# Patient Record
Sex: FEMALE | Race: BLACK OR AFRICAN AMERICAN | NOT HISPANIC OR LATINO | Employment: OTHER | URBAN - METROPOLITAN AREA
[De-identification: names, ages, dates, MRNs, and addresses within clinical notes are randomized per-mention and may not be internally consistent; named-entity substitution may affect disease eponyms.]

---

## 2021-11-03 ENCOUNTER — OFFICE VISIT (OUTPATIENT)
Dept: OBSTETRICS AND GYNECOLOGY | Facility: CLINIC | Age: 66
End: 2021-11-03

## 2021-11-03 VITALS
DIASTOLIC BLOOD PRESSURE: 80 MMHG | SYSTOLIC BLOOD PRESSURE: 162 MMHG | HEIGHT: 65 IN | WEIGHT: 211.4 LBS | BODY MASS INDEX: 35.22 KG/M2

## 2021-11-03 DIAGNOSIS — B97.7 HPV (HUMAN PAPILLOMA VIRUS) INFECTION: Primary | ICD-10-CM

## 2021-11-03 PROCEDURE — 99202 OFFICE O/P NEW SF 15 MIN: CPT | Performed by: OBSTETRICS & GYNECOLOGY

## 2021-11-03 RX ORDER — THIAMINE HCL 100 MG
TABLET ORAL
COMMUNITY

## 2021-11-03 RX ORDER — ERGOCALCIFEROL 1.25 MG/1
CAPSULE ORAL
COMMUNITY
Start: 2021-10-07

## 2021-11-03 RX ORDER — ANASTROZOLE 1 MG/1
TABLET ORAL
COMMUNITY
Start: 2021-09-23

## 2021-11-03 RX ORDER — PETROLATUM,WHITE
OINTMENT IN PACKET (GRAM) TOPICAL
COMMUNITY
Start: 2021-10-24

## 2021-11-03 RX ORDER — ATORVASTATIN CALCIUM 20 MG/1
TABLET, FILM COATED ORAL
COMMUNITY
Start: 2021-10-15

## 2021-11-03 RX ORDER — ASPIRIN 81 MG/1
81 TABLET ORAL DAILY
COMMUNITY

## 2021-11-03 RX ORDER — ECHINACEA PURPUREA EXTRACT 125 MG
TABLET ORAL
COMMUNITY
Start: 2021-10-24

## 2021-11-03 RX ORDER — AMLODIPINE BESYLATE 10 MG/1
TABLET ORAL
COMMUNITY
Start: 2021-10-07

## 2021-11-03 RX ORDER — CALCIUM CITRATE 1040MG
TABLET ORAL
COMMUNITY
Start: 2021-08-25

## 2021-11-03 RX ORDER — FERROUS SULFATE 325(65) MG
TABLET ORAL
COMMUNITY
Start: 2021-10-25

## 2021-11-03 RX ORDER — CETIRIZINE HYDROCHLORIDE 10 MG/1
10 TABLET ORAL DAILY
COMMUNITY

## 2021-11-03 RX ORDER — OXYMETAZOLINE HYDROCHLORIDE 0.05 G/100ML
SPRAY NASAL
COMMUNITY
Start: 2021-10-24

## 2021-11-03 NOTE — PROGRESS NOTES
"        REASON FOR FOLLOWUP/CHIEF COMPLAINT: Sent from primary care office for \"HPV infection\"     HISTORY OF PRESENT ILLNESS: Patient had records that I reviewed and the only thing related to HPV that I can find was a Pap smear done in 2019 that was negative for high risk HPV.  The patient is herself uncertain about the full reason for the visit and denies that she has any known lesions and states that her last pelvic exam was in 2019.  Nonetheless we discussed the nature of HPV and sometimes it can be found on Pap screening.  She has had a hysterectomy in the past and I told her that she does not need Pap screening going forward.  She also does not need any further assessment for HPV unless she is symptomatic.      Past Medical History, Past Surgical History, Social History, Family History have been reviewed and are without significant changes except as mentioned.    Review of Systems   Review of Systems   Constitutional: Negative for fatigue and fever.   Respiratory: Negative for cough and shortness of breath.    Genitourinary: Negative for pelvic pain, vaginal bleeding and vaginal discharge.       Medications:  The current medication list was reviewed in the EMR    ALLERGIES:    Allergies   Allergen Reactions   • Penicillins Hives     Some penicillin based      • Amoxicillin-Pot Clavulanate Hives              Vitals:    11/03/21 1107   BP: 162/80   Weight: 95.9 kg (211 lb 6.4 oz)   Height: 165.1 cm (65\")     Physical Exam    CONSTITUTIONAL:  Vital signs reviewed.  No distress, looks comfortable.    CARDIOVASCULAR:  Normal S1, S2.  No murmurs rubs or gallops.  No significant lower extremity edema.  GASTROINTESTINAL: Abdomen appears unremarkable.  Nontender.  No hepatomegaly.  No splenomegaly.    PELVIC: Normal external genitalia with no lesions.  Normal vaginal sidewalls and vaginal cuff.  There are no lesions present in the vagina as well.  Bimanual exam is normal.    SKIN:  Warm.  No rashes.  PSYCHIATRIC:  " Normal judgment and insight.  Normal mood and affect.       RECENT LABS:No results found for: WBC, HGB, PLT    ASSESSMENT/PLAN:  [facility] Room/bed info not found   History of HPV infection but completely normal on today's exam.  No need for further Pap screening given her age and the fact that she has had a hysterectomy in the past.  We will be happy to see as needed and certainly a Pap screen could be done as part of a work-up for abnormal discharge or bleeding.